# Patient Record
Sex: FEMALE | NOT HISPANIC OR LATINO | URBAN - METROPOLITAN AREA
[De-identification: names, ages, dates, MRNs, and addresses within clinical notes are randomized per-mention and may not be internally consistent; named-entity substitution may affect disease eponyms.]

---

## 2017-03-06 ENCOUNTER — IMPORTED ENCOUNTER (OUTPATIENT)
Dept: URBAN - METROPOLITAN AREA CLINIC 1 | Facility: CLINIC | Age: 57
End: 2017-03-06

## 2017-03-06 PROBLEM — H25.813: Noted: 2017-03-06

## 2017-03-06 PROBLEM — H40.013: Noted: 2017-03-06

## 2017-03-06 PROBLEM — H04.123: Noted: 2017-03-06

## 2017-03-06 PROBLEM — H16.143: Noted: 2017-03-06

## 2017-03-06 PROBLEM — H10.45: Noted: 2017-03-06

## 2017-03-06 PROCEDURE — 92012 INTRM OPH EXAM EST PATIENT: CPT

## 2017-03-06 NOTE — PATIENT DISCUSSION
1.  Allergic Conjunctivitis OU- Begin Pazeo Qdaily-BID OU (Sample and rx sent to pt's pharm) 2. Cataract OU: Observe for now without intervention. The patient was advised to contact us if any change or worsening of vision3. JACKELINE w/ increased PEK OU- Inserted Medium Plugs RLL LLL today with no complications; Silicone Plug RLL and LLL:  Risks benefits and alternatives to placing plug was discussed with patient. Patient understands and would like to proceed. Consent was signed. Post op instructions were discussed/given to patient and patient was advised to call our office if any problems arose. Cont ATs QID OU routinely. 4.  Glaucoma Suspect OU (CD 0.8 OU): Neg Fm Hx. Stable IOP OU on no meds. Past w/u negative. Cont to observe on no meds. Patient is considered Low Risk. Return for an appointment in 3 mo 10 (check K) with Dr. Colton Ayala.

## 2017-06-05 ENCOUNTER — IMPORTED ENCOUNTER (OUTPATIENT)
Dept: URBAN - METROPOLITAN AREA CLINIC 1 | Facility: CLINIC | Age: 57
End: 2017-06-05

## 2017-06-05 PROBLEM — H04.123: Noted: 2017-06-05

## 2017-06-05 PROBLEM — H25.813: Noted: 2017-06-05

## 2017-06-05 PROBLEM — H40.013: Noted: 2017-06-05

## 2017-06-05 PROBLEM — H10.45: Noted: 2017-06-05

## 2017-06-05 PROBLEM — H16.143: Noted: 2017-06-05

## 2017-06-05 PROCEDURE — 92012 INTRM OPH EXAM EST PATIENT: CPT

## 2017-06-05 NOTE — PATIENT DISCUSSION
1.  JACKELINE w/ decreased PEK OU- Much improved post punctal occlusion. Plugs intact LL's OU. The continuation of artificial tears were recommended. Continue tear JARROD OU QHS. 2. Cataract OU: Observe for now without intervention. The patient was advised to contact us if any change or worsening of vision3. Allergic Conjunctivitis OU- Continue Zaditor OU BID (Pazeo not covered by insurance.) The condition was  discussed with the patient. Avoidance of allergens and cool compresses were recommended. 4. Glaucoma Suspect OU (0.8): Stable IOP OU. Neg. FHX. Past w/u (-). Patient is considered Low Risk. Condition was discussed with patient and patient understands. 5.  Return for an appt w/ PMG in 6 months for a 30/OCT.

## 2017-12-04 ENCOUNTER — IMPORTED ENCOUNTER (OUTPATIENT)
Dept: URBAN - METROPOLITAN AREA CLINIC 1 | Facility: CLINIC | Age: 57
End: 2017-12-04

## 2017-12-04 PROBLEM — H25.813: Noted: 2017-12-04

## 2017-12-04 PROBLEM — H40.013: Noted: 2017-12-04

## 2017-12-04 PROBLEM — H16.143: Noted: 2017-12-04

## 2017-12-04 PROBLEM — H04.123: Noted: 2017-12-04

## 2017-12-04 PROBLEM — H10.45: Noted: 2017-12-04

## 2017-12-04 PROCEDURE — 83861 MICROFLUID ANALY TEARS: CPT

## 2017-12-04 PROCEDURE — 92014 COMPRE OPH EXAM EST PT 1/>: CPT

## 2017-12-04 PROCEDURE — 92133 CPTRZD OPH DX IMG PST SGM ON: CPT

## 2017-12-04 PROCEDURE — 92015 DETERMINE REFRACTIVE STATE: CPT

## 2017-12-04 NOTE — PATIENT DISCUSSION
1.  Glaucoma Suspect OU (0.8 OU): Stable IOP and C/D OU. OCT remains normal OU. Patient is considered Low Risk. Condition was discussed with patient and patient understands. Will continue to monitor patient for any progression in condition. Patient was advised to call us with any problems questions or concerns. 2.  JACKELINE w/ PEK OU- Increased symptoms. Mild per tear lab OU. Plug out RLL intact LLL. Recommend replacing plug RLL pt agrees. The use of artificial tears OU TID were recommended routinely. Continue tear JARROD OU QHS. Replaced w/ Large Silicone Plug RLL:  Risks benefits and alternatives to placing plug was discussed with patient. Patient understands and would like to proceed. Consent was signed. Post op instructions were discussed/given to patient and patient was advised to call our office if any problems arose. 3.  Allergic Conjunctivitis OU- Again recommend starting Zaditor OU BID. The condition was  discussed with the patient. Avoidance of allergens and cool compresses were recommended. 4. Cataract OU: Observe for now without intervention. The patient was advised to contact us if any change or worsening of vision5. Return for an appointment for a 10 in 6 months with Dr. Meron Wise.

## 2018-06-25 ENCOUNTER — IMPORTED ENCOUNTER (OUTPATIENT)
Dept: URBAN - METROPOLITAN AREA CLINIC 1 | Facility: CLINIC | Age: 58
End: 2018-06-25

## 2018-06-25 PROBLEM — H16.143: Noted: 2018-06-25

## 2018-06-25 PROBLEM — H25.813: Noted: 2018-06-25

## 2018-06-25 PROBLEM — H04.123: Noted: 2018-06-25

## 2018-06-25 PROCEDURE — 99213 OFFICE O/P EST LOW 20 MIN: CPT

## 2019-06-21 ENCOUNTER — IMPORTED ENCOUNTER (OUTPATIENT)
Dept: URBAN - METROPOLITAN AREA CLINIC 1 | Facility: CLINIC | Age: 59
End: 2019-06-21

## 2019-06-21 PROBLEM — H25.813: Noted: 2019-06-21

## 2019-06-21 PROBLEM — H04.123: Noted: 2019-06-21

## 2019-06-21 PROBLEM — H16.143: Noted: 2019-06-21

## 2019-06-21 PROCEDURE — 92014 COMPRE OPH EXAM EST PT 1/>: CPT

## 2019-06-21 NOTE — PATIENT DISCUSSION
1.  Cataract OU: Progression noted. 2.  JACKELINE w/ increased PEK OU- H/o Plugs LLs OU Plug out LLL. Increase ATs to QID OU Routinely. Consider adding Restasis vs. Xiidra w/o improvement. 3.  Allergic Conjunctivitis OU- Cont Zaditor BID OU. 4.  COAG Suspect OU (CD 0.8 OU) Neg Fm Hx. Past w/u negative. Patient considered Low Risk. MRx deferred (Will return on [employer] 399) Return for an appointment in 6 mo 10/OCT (Check K) with Dr. Mona Velez. Return for an appointment Next available 36 with Dr. Mona Velez or with [facility]

## 2019-06-24 ENCOUNTER — IMPORTED ENCOUNTER (OUTPATIENT)
Dept: URBAN - METROPOLITAN AREA CLINIC 1 | Facility: CLINIC | Age: 59
End: 2019-06-24

## 2019-06-24 PROBLEM — H52.4: Noted: 2019-06-24

## 2019-06-24 PROBLEM — H52.13: Noted: 2019-06-24

## 2019-06-24 PROCEDURE — S0621 ROUTINE OPHTHALMOLOGICAL EXA: HCPCS

## 2019-06-24 NOTE — PATIENT DISCUSSION
1. Myopia OU -- Finalized Glasses MRx was given to patients today for correction if indicated and requested2. Presbyopia OU3. Cataracts OU -- Observe 4. JACKELINE w/ increased PEK OU (H/o Plugs LLs OU Plug out LLL) -- Recommend continue the frequent use of OTC AT's QID OU Routinely. Consider adding Restasis vs. Xiidra w/o improvement. 5.  Allergic Conjunctivitis OU -- Cont Zaditor BID OU. 6.  COAG Suspect OU (CD 0.8 OU) -- IOP stable OU. Neg Fm Hx. Past w/u negative. Patient considered Low Risk. Return for an appointment in 1 YR for a 36 OU with Dr. Neva Catalan. Return as scheduled in January 2020 for Ruy / Jamir Davis appointment with Dr. Chano Garcia.

## 2020-01-10 ENCOUNTER — IMPORTED ENCOUNTER (OUTPATIENT)
Dept: URBAN - METROPOLITAN AREA CLINIC 1 | Facility: CLINIC | Age: 60
End: 2020-01-10

## 2020-01-10 PROBLEM — G43.909: Noted: 2020-01-10

## 2020-01-10 PROCEDURE — 92012 INTRM OPH EXAM EST PATIENT: CPT

## 2020-01-10 NOTE — PATIENT DISCUSSION
1.  Ocular Migraine- Reassurance and explanation was given to patient. 2. PVD OD- RD precautions. 3.  Cataracts OU - Observe 4. JACKELINE w/ PEK OU (H/o Plugs LLs OU Plug out LLL) Cont ATs QID OU Routinely. Consider adding Restasis vs. Xiidra w/o improvement. 5.  Allergic Conjunctivitis OU - Cont Zaditor BID OU. 6.  COAG Suspect OU (CD 0.8 OU) - IOP stable OU. Neg Fm Hx. Past w/u negative. Patient considered Low Risk. F/u as scheduled

## 2020-01-17 ENCOUNTER — IMPORTED ENCOUNTER (OUTPATIENT)
Dept: URBAN - METROPOLITAN AREA CLINIC 1 | Facility: CLINIC | Age: 60
End: 2020-01-17

## 2020-01-17 PROBLEM — H40.013: Noted: 2020-01-17

## 2020-01-17 PROCEDURE — 92133 CPTRZD OPH DX IMG PST SGM ON: CPT

## 2020-01-17 PROCEDURE — 92012 INTRM OPH EXAM EST PATIENT: CPT

## 2020-01-17 NOTE — PATIENT DISCUSSION
1.  Glaucoma Suspect OU (CD 0.80 OU): OCT remains WNL OU. IOP stable. Negative family hx. Patient is considered Low Risk. Condition was discussed with patient and patient understands. Will continue to monitor patient for any progression in condition. Patient was advised to call us with any problems questions or concerns. 2.  JACKELINE w/ improved PEK OU- (H/o Plugs LLs OU Plug out LLL). Recommend PF ATs QID OU routinely. Consider adding Restasis vs. Xiidra w/o improvement. 3.  Cataract OU - Obeserve 4. Allergic Conjunctivitis OU- Cont Zaditor BID OU. 5.  H/o PVD OD 6. H/o Ocular MigraineReturn for an appointment in June 40 with Dr. Jen Ceullo. Return for an appointment in 1 year 30 (no testing per PMG) with Dr. Steve Lasren.

## 2020-01-17 NOTE — PATIENT DISCUSSION
JACKELINE w/ improved PEK OU- (H/o Plugs LLs OU Plug out LLL). Recommend PF ATs QID OU routinely. Consider adding Restasis vs. Xiidra w/o improvement. 3.  Cataract OU - Obeserve 4.   Allergic Conjunctivitis OU- Cont Zaditor BID OU. 5.  H/o PVD OD

## 2022-04-02 ASSESSMENT — TONOMETRY
OD_IOP_MMHG: 15
OD_IOP_MMHG: 16
OD_IOP_MMHG: 14
OS_IOP_MMHG: 16
OD_IOP_MMHG: 15
OD_IOP_MMHG: 15
OS_IOP_MMHG: 16
OS_IOP_MMHG: 15
OS_IOP_MMHG: 15
OS_IOP_MMHG: 16
OD_IOP_MMHG: 16
OD_IOP_MMHG: 15
OS_IOP_MMHG: 15
OS_IOP_MMHG: 14

## 2022-04-02 ASSESSMENT — KERATOMETRY
OS_AXISANGLE_DEGREES: 179
OD_K1POWER_DIOPTERS: 44.50
OS_K1POWER_DIOPTERS: 44.00
OD_K2POWER_DIOPTERS: 45.00
OS_AXISANGLE2_DEGREES: 089
OS_K2POWER_DIOPTERS: 44.75
OD_AXISANGLE_DEGREES: 023
OD_AXISANGLE2_DEGREES: 113

## 2022-04-02 ASSESSMENT — VISUAL ACUITY
OS_CC: J1
OD_GLARE: 20/60
OD_CC: J1+
OD_GLARE: 20/60
OD_CC: J1+
OS_GLARE: 20/60
OD_SC: 20/20
OS_SC: 20/20
OS_SC: 20/25
OS_SC: 20/20
OS_SC: 20/20
OD_GLARE: 20/60
OS_SC: 20/20
OD_CC: J1
OS_SC: 20/20
OD_SC: 20/20-2
OD_SC: 20/20
OD_SC: 20/25
OS_SC: 20/20
OS_SC: 20/20-2
OD_SC: 20/20
OS_CC: J1+
OS_GLARE: 20/60
OD_SC: 20/20
OS_GLARE: 20/60
OD_SC: 20/20
OS_GLARE: 20/60
OS_CC: J1+
OD_SC: 20/20
OD_GLARE: 20/60